# Patient Record
Sex: FEMALE | Race: WHITE | NOT HISPANIC OR LATINO | Employment: OTHER | URBAN - METROPOLITAN AREA
[De-identification: names, ages, dates, MRNs, and addresses within clinical notes are randomized per-mention and may not be internally consistent; named-entity substitution may affect disease eponyms.]

---

## 2018-03-28 NOTE — PATIENT DISCUSSION
Cataracts are visually significant and impacting patients quality of life.   Refer to [facility] for Cataract Removal Consultation

## 2018-03-28 NOTE — PATIENT DISCUSSION
ONH cupping OS, symmetrical pressure, no evidence for cause of unilateral cupping, maybe old stroke or trauma. nerve not very pale. pt aware of issue and was told by old eye doc that nerve was weak.   request records from [facility]

## 2018-04-16 NOTE — PATIENT DISCUSSION
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 I have advised the patient to refrain from driving.

## 2018-05-11 NOTE — PATIENT DISCUSSION
POST-OP INSTRUCTIONS OS:  The patient is instructed in the proper use of post-operative eyedrops: Ocuflox in the affected eye 4 times per day for 2 weeks and prednisolone acetate 1% 4 times a day for 2 weeks then twice a day for 2 weeks. The patient is also inctructed to follow the usual post-operative precautions outlined in the written instructions including the need to keep a shield taped over the operated eye while sleeping for 2 weeks, and the need to avoid heavy lifting for 2 weeks. The patient is instructed to contact me immediately for any significant pain around the eye, or sustained loss of vision. The patient will return for follow-up in one week.

## 2018-05-11 NOTE — PATIENT DISCUSSION
Continue: Pred Forte (prednisolone acetate): drops,suspension: 1% 1 drop four times a day into affected eye 04-

## 2018-09-11 ENCOUNTER — ESTABLISHED COMPREHENSIVE EXAM (OUTPATIENT)
Dept: URBAN - METROPOLITAN AREA CLINIC 39 | Facility: CLINIC | Age: 68
End: 2018-09-11

## 2018-09-11 DIAGNOSIS — H25.811: ICD-10-CM

## 2018-09-11 DIAGNOSIS — H43.813: ICD-10-CM

## 2018-09-11 DIAGNOSIS — H18.453: ICD-10-CM

## 2018-09-11 DIAGNOSIS — H25.812: ICD-10-CM

## 2018-09-11 DIAGNOSIS — H16.223: ICD-10-CM

## 2018-09-11 PROCEDURE — 92015 DETERMINE REFRACTIVE STATE: CPT

## 2018-09-11 PROCEDURE — 92014 COMPRE OPH EXAM EST PT 1/>: CPT

## 2018-09-11 PROCEDURE — G9903 PT SCRN TBCO ID AS NON USER: HCPCS

## 2018-09-11 PROCEDURE — 1036F TOBACCO NON-USER: CPT

## 2018-09-11 PROCEDURE — G8427 DOCREV CUR MEDS BY ELIG CLIN: HCPCS

## 2018-09-11 PROCEDURE — G8785 BP SCRN NO PERF AT INTERVAL: HCPCS

## 2018-09-11 ASSESSMENT — VISUAL ACUITY
OD_SC: 20/60
OD_SC: J6
OS_CC: J1+
OS_SC: J5
OD_BAT: 20/60
OS_BAT: 20/70
OD_CC: J2
OS_SC: 20/40-2
OS_CC: 20/25-2
OD_CC: 20/30

## 2018-09-11 ASSESSMENT — TONOMETRY
OD_IOP_MMHG: 14
OS_IOP_MMHG: 15

## 2019-08-13 ENCOUNTER — CATARACT CONSULT (OUTPATIENT)
Dept: URBAN - METROPOLITAN AREA CLINIC 39 | Facility: CLINIC | Age: 69
End: 2019-08-13

## 2019-08-13 DIAGNOSIS — H25.811: ICD-10-CM

## 2019-08-13 DIAGNOSIS — H04.123: ICD-10-CM

## 2019-08-13 DIAGNOSIS — H18.451: ICD-10-CM

## 2019-08-13 DIAGNOSIS — H25.812: ICD-10-CM

## 2019-08-13 DIAGNOSIS — H18.452: ICD-10-CM

## 2019-08-13 PROCEDURE — 92134 CPTRZ OPH DX IMG PST SGM RTA: CPT

## 2019-08-13 PROCEDURE — 92025 CPTRIZED CORNEAL TOPOGRAPHY: CPT

## 2019-08-13 PROCEDURE — 92136TC INTERFEROMETRY - TECHNICAL COMPONENT

## 2019-08-13 PROCEDURE — V2799CY CYCLOSPORINE 0.1% - KLARITY C

## 2019-08-13 PROCEDURE — 92014 COMPRE OPH EXAM EST PT 1/>: CPT

## 2019-08-13 ASSESSMENT — VISUAL ACUITY
OD_SC: 20/40
OS_CC: 20/25
OD_CC: J3
OS_BAT: 20/200
OS_CC: J3
OS_SC: 20/40+2
OD_CC: 20/30-1
OD_BAT: 20/200

## 2019-08-13 ASSESSMENT — TONOMETRY
OS_IOP_MMHG: 15
OD_IOP_MMHG: 13

## 2019-09-11 ENCOUNTER — PRE-OP/H&P (OUTPATIENT)
Dept: URBAN - METROPOLITAN AREA SURGERY 14 | Facility: SURGERY | Age: 69
End: 2019-09-11

## 2019-09-11 ENCOUNTER — SURGERY/PROCEDURE (OUTPATIENT)
Dept: URBAN - METROPOLITAN AREA CLINIC 39 | Facility: CLINIC | Age: 69
End: 2019-09-11

## 2019-09-11 DIAGNOSIS — H25.811: ICD-10-CM

## 2019-09-11 DIAGNOSIS — H18.451: ICD-10-CM

## 2019-09-11 DIAGNOSIS — H18.452: ICD-10-CM

## 2019-09-11 DIAGNOSIS — H25.812: ICD-10-CM

## 2019-09-11 DIAGNOSIS — H04.123: ICD-10-CM

## 2019-09-11 PROCEDURE — 65435 CURETTE/TREAT CORNEA: CPT

## 2019-09-11 PROCEDURE — 99499 UNLISTED E&M SERVICE: CPT

## 2019-09-11 PROCEDURE — 65400 REMOVAL OF EYE LESION: CPT

## 2019-09-12 ENCOUNTER — 1 DAY POST-OP (OUTPATIENT)
Dept: URBAN - METROPOLITAN AREA CLINIC 39 | Facility: CLINIC | Age: 69
End: 2019-09-12

## 2019-09-12 DIAGNOSIS — Z98.890: ICD-10-CM

## 2019-09-12 PROCEDURE — 66999PO NON CO-MANAGED OTHER SURGERY PO

## 2019-09-12 ASSESSMENT — VISUAL ACUITY: OD_SC: 20/60-2

## 2019-09-18 ENCOUNTER — POST-OP (OUTPATIENT)
Dept: URBAN - METROPOLITAN AREA CLINIC 39 | Facility: CLINIC | Age: 69
End: 2019-09-18

## 2019-09-18 DIAGNOSIS — Z98.890: ICD-10-CM

## 2019-09-18 PROCEDURE — 66999PO NON CO-MANAGED OTHER SURGERY PO

## 2019-10-23 ENCOUNTER — POST-OP (OUTPATIENT)
Dept: URBAN - METROPOLITAN AREA CLINIC 39 | Facility: CLINIC | Age: 69
End: 2019-10-23

## 2019-10-23 DIAGNOSIS — Z98.890: ICD-10-CM

## 2019-10-23 PROCEDURE — 66999PO NON CO-MANAGED OTHER SURGERY PO

## 2019-10-23 ASSESSMENT — VISUAL ACUITY
OD_CC: 20/40+2
OD_SC: J5
OD_SC: 20/40-1
OD_CC: J1

## 2019-10-23 ASSESSMENT — TONOMETRY
OD_IOP_MMHG: 13
OS_IOP_MMHG: 12

## 2020-07-28 ENCOUNTER — CORNEA CONSULT (OUTPATIENT)
Dept: URBAN - METROPOLITAN AREA CLINIC 39 | Facility: CLINIC | Age: 70
End: 2020-07-28

## 2020-07-28 DIAGNOSIS — H04.123: ICD-10-CM

## 2020-07-28 DIAGNOSIS — H43.813: ICD-10-CM

## 2020-07-28 DIAGNOSIS — H18.452: ICD-10-CM

## 2020-07-28 DIAGNOSIS — H25.811: ICD-10-CM

## 2020-07-28 DIAGNOSIS — H25.812: ICD-10-CM

## 2020-07-28 PROCEDURE — 92015 DETERMINE REFRACTIVE STATE: CPT

## 2020-07-28 PROCEDURE — 92134 CPTRZ OPH DX IMG PST SGM RTA: CPT

## 2020-07-28 PROCEDURE — V2799PMN IMPRIMIS PRED-MOXI-NEPAF 5ML

## 2020-07-28 PROCEDURE — 92025 CPTRIZED CORNEAL TOPOGRAPHY: CPT

## 2020-07-28 PROCEDURE — 92012 INTRM OPH EXAM EST PATIENT: CPT

## 2020-07-28 PROCEDURE — 92136TC INTERFEROMETRY - TECHNICAL COMPONENT

## 2020-07-28 PROCEDURE — 92286 ANT SGM IMG I&R SPECLR MIC: CPT

## 2020-07-28 ASSESSMENT — VISUAL ACUITY
OS_CC: 20/40+1
OU_CC: 20/30
OD_CC: 20/40-
OU_CC: J1-
OS_CC: J2
OD_CC: J1-

## 2020-07-28 ASSESSMENT — TONOMETRY
OS_IOP_MMHG: 11
OD_IOP_MMHG: 11

## 2020-08-13 ENCOUNTER — ESTABLISHED PATIENT (OUTPATIENT)
Dept: URBAN - METROPOLITAN AREA SURGERY 14 | Facility: SURGERY | Age: 70
End: 2020-08-13

## 2020-08-13 ENCOUNTER — SURGERY/PROCEDURE (OUTPATIENT)
Dept: URBAN - METROPOLITAN AREA CLINIC 39 | Facility: CLINIC | Age: 70
End: 2020-08-13

## 2020-08-13 DIAGNOSIS — H25.811: ICD-10-CM

## 2020-08-13 DIAGNOSIS — Z98.890: ICD-10-CM

## 2020-08-13 DIAGNOSIS — H25.812: ICD-10-CM

## 2020-08-13 DIAGNOSIS — H18.452: ICD-10-CM

## 2020-08-13 PROCEDURE — 66984AV REMOVE CATARACT, INSERT ADVANCED LENS

## 2020-08-13 PROCEDURE — 66999LNSR LENSAR LASER FOR CAT SX

## 2020-08-13 PROCEDURE — 99211HP H&P OFFICE/OUTPATIENT VISIT, EST

## 2020-08-13 PROCEDURE — 65772LRI LRI DURING CAT SX

## 2020-08-14 ENCOUNTER — CATARACT POST-OP 1-DAY (OUTPATIENT)
Dept: URBAN - METROPOLITAN AREA CLINIC 39 | Facility: CLINIC | Age: 70
End: 2020-08-14

## 2020-08-14 DIAGNOSIS — Z96.1: ICD-10-CM

## 2020-08-14 PROCEDURE — 99024 POSTOP FOLLOW-UP VISIT: CPT

## 2020-08-14 ASSESSMENT — VISUAL ACUITY
OD_SC: J5
OD_SC: 20/30-1

## 2020-08-14 ASSESSMENT — TONOMETRY
OD_IOP_MMHG: 13
OS_IOP_MMHG: 12

## 2020-08-18 ENCOUNTER — POST OP/EVAL OF SECOND EYE (OUTPATIENT)
Dept: URBAN - METROPOLITAN AREA CLINIC 39 | Facility: CLINIC | Age: 70
End: 2020-08-18

## 2020-08-18 DIAGNOSIS — H25.812: ICD-10-CM

## 2020-08-18 PROCEDURE — 92012 INTRM OPH EXAM EST PATIENT: CPT

## 2020-08-18 ASSESSMENT — TONOMETRY
OD_IOP_MMHG: 12
OS_IOP_MMHG: 13

## 2020-08-18 ASSESSMENT — VISUAL ACUITY
OS_BAT: 20/200
OD_SC: J6
OD_PH: 20/40
OD_SC: 20/60

## 2020-08-18 ASSESSMENT — PACHYMETRY
OS_CT_UM: 534
OD_CT_UM: 549

## 2020-08-20 ENCOUNTER — FOLLOW UP (OUTPATIENT)
Dept: URBAN - METROPOLITAN AREA CLINIC 39 | Facility: CLINIC | Age: 70
End: 2020-08-20

## 2020-08-20 ENCOUNTER — SURGERY/PROCEDURE (OUTPATIENT)
Dept: URBAN - METROPOLITAN AREA CLINIC 39 | Facility: CLINIC | Age: 70
End: 2020-08-20

## 2020-08-20 ENCOUNTER — ESTABLISHED PATIENT (OUTPATIENT)
Dept: URBAN - METROPOLITAN AREA SURGERY 14 | Facility: SURGERY | Age: 70
End: 2020-08-20

## 2020-08-20 DIAGNOSIS — Z96.1: ICD-10-CM

## 2020-08-20 DIAGNOSIS — H43.813: ICD-10-CM

## 2020-08-20 DIAGNOSIS — H04.123: ICD-10-CM

## 2020-08-20 DIAGNOSIS — H25.812: ICD-10-CM

## 2020-08-20 DIAGNOSIS — H18.452: ICD-10-CM

## 2020-08-20 DIAGNOSIS — H26.491: ICD-10-CM

## 2020-08-20 PROCEDURE — 99211T TECH SERVICE

## 2020-08-20 PROCEDURE — 66999LNSR LENSAR LASER FOR CAT SX

## 2020-08-20 PROCEDURE — 65772LRI LRI DURING CAT SX

## 2020-08-20 PROCEDURE — 99211HP H&P OFFICE/OUTPATIENT VISIT, EST

## 2020-08-20 PROCEDURE — 66984AV REMOVE CATARACT, INSERT ADVANCED LENS

## 2020-08-20 ASSESSMENT — VISUAL ACUITY
OS_SC: 20/40
OS_SC: J8
OD_SC: 20/40

## 2020-08-21 ENCOUNTER — CATARACT POST-OP 1-DAY (OUTPATIENT)
Dept: URBAN - METROPOLITAN AREA CLINIC 39 | Facility: CLINIC | Age: 70
End: 2020-08-21

## 2020-08-21 DIAGNOSIS — Z96.1: ICD-10-CM

## 2020-08-21 PROCEDURE — 99024 POSTOP FOLLOW-UP VISIT: CPT

## 2020-08-21 ASSESSMENT — VISUAL ACUITY
OS_SC: J12
OS_SC: 20/50-1
OD_SC: J4
OD_SC: 20/40-2

## 2020-08-21 ASSESSMENT — TONOMETRY
OS_IOP_MMHG: 19
OD_IOP_MMHG: 12

## 2020-09-11 ENCOUNTER — POST-OP CATARACT (OUTPATIENT)
Dept: URBAN - METROPOLITAN AREA CLINIC 39 | Facility: CLINIC | Age: 70
End: 2020-09-11

## 2020-09-11 DIAGNOSIS — Z96.1: ICD-10-CM

## 2020-09-11 PROCEDURE — 99024 POSTOP FOLLOW-UP VISIT: CPT

## 2020-09-11 ASSESSMENT — VISUAL ACUITY
OS_SC: 20/25+2
OS_SC: J3
OD_SC: J6
OD_SC: 20/70

## 2020-09-11 ASSESSMENT — TONOMETRY
OD_IOP_MMHG: 10
OS_IOP_MMHG: 10

## 2020-09-29 ENCOUNTER — POST-OP CATARACT (OUTPATIENT)
Dept: URBAN - METROPOLITAN AREA CLINIC 39 | Facility: CLINIC | Age: 70
End: 2020-09-29

## 2020-09-29 DIAGNOSIS — Z96.1: ICD-10-CM

## 2020-09-29 PROCEDURE — 99024 POSTOP FOLLOW-UP VISIT: CPT

## 2020-09-29 ASSESSMENT — VISUAL ACUITY
OS_BAT: 20/50
OS_SC: 20/25-1
OD_BAT: 20/200
OS_SC: J1
OU_SC: 20/25
OD_SC: 20/40-1
OD_SC: J5

## 2020-09-29 ASSESSMENT — TONOMETRY
OD_IOP_MMHG: 10
OS_IOP_MMHG: 10

## 2020-10-21 ENCOUNTER — SURGERY/PROCEDURE (OUTPATIENT)
Dept: URBAN - METROPOLITAN AREA SURGERY 14 | Facility: SURGERY | Age: 70
End: 2020-10-21

## 2020-10-21 ENCOUNTER — ESTABLISHED PATIENT (OUTPATIENT)
Dept: URBAN - METROPOLITAN AREA CLINIC 39 | Facility: CLINIC | Age: 70
End: 2020-10-21

## 2020-10-21 DIAGNOSIS — H26.493: ICD-10-CM

## 2020-10-21 PROCEDURE — 92014 COMPRE OPH EXAM EST PT 1/>: CPT

## 2020-10-21 PROCEDURE — 6682150 YAG CAPSULOTOMY

## 2020-10-21 ASSESSMENT — TONOMETRY
OS_IOP_MMHG: 9
OD_IOP_MMHG: 12

## 2020-10-21 ASSESSMENT — VISUAL ACUITY
OD_BAT: 20/200
OS_SC: 20/30
OD_SC: 20/40+1
OS_BAT: 20/50

## 2020-10-28 ENCOUNTER — YAG POST-OP (OUTPATIENT)
Dept: URBAN - METROPOLITAN AREA CLINIC 39 | Facility: CLINIC | Age: 70
End: 2020-10-28

## 2020-10-28 DIAGNOSIS — Z98.890: ICD-10-CM

## 2020-10-28 PROCEDURE — 99024 POSTOP FOLLOW-UP VISIT: CPT

## 2020-10-28 ASSESSMENT — KERATOMETRY
OS_K1POWER_DIOPTERS: 44.25
OD_K2POWER_DIOPTERS: 45.5
OD_K1POWER_DIOPTERS: 44.5
OS_K2POWER_DIOPTERS: 46
OD_AXISANGLE2_DEGREES: 135
OS_AXISANGLE2_DEGREES: 113
OD_AXISANGLE_DEGREES: 45
OS_AXISANGLE_DEGREES: 23

## 2020-10-28 ASSESSMENT — VISUAL ACUITY
OU_SC: J2
OS_SC: 20/25+2
OU_SC: 20/20-2
OD_SC: J5
OS_SC: J2
OD_SC: 20/30-1

## 2020-10-28 ASSESSMENT — TONOMETRY
OD_IOP_MMHG: 9
OS_IOP_MMHG: 9

## 2021-04-28 ENCOUNTER — ESTABLISHED COMPREHENSIVE EXAM (OUTPATIENT)
Dept: URBAN - METROPOLITAN AREA CLINIC 39 | Facility: CLINIC | Age: 71
End: 2021-04-28

## 2021-04-28 DIAGNOSIS — H52.223: ICD-10-CM

## 2021-04-28 DIAGNOSIS — H43.813: ICD-10-CM

## 2021-04-28 DIAGNOSIS — H52.4: ICD-10-CM

## 2021-04-28 DIAGNOSIS — H16.223: ICD-10-CM

## 2021-04-28 DIAGNOSIS — H52.01: ICD-10-CM

## 2021-04-28 DIAGNOSIS — H18.452: ICD-10-CM

## 2021-04-28 PROCEDURE — 68761S PUNCTUM PLUG / SILICONE,EACH

## 2021-04-28 PROCEDURE — 92015 DETERMINE REFRACTIVE STATE: CPT

## 2021-04-28 PROCEDURE — A4263 PERMANENT TEAR DUCT PLUG: HCPCS

## 2021-04-28 PROCEDURE — 92014 COMPRE OPH EXAM EST PT 1/>: CPT

## 2021-04-28 ASSESSMENT — KERATOMETRY
OS_AXISANGLE2_DEGREES: 113
OD_K1POWER_DIOPTERS: 44.5
OD_K2POWER_DIOPTERS: 45.5
OS_K2POWER_DIOPTERS: 46
OS_AXISANGLE_DEGREES: 23
OD_AXISANGLE2_DEGREES: 135
OD_AXISANGLE_DEGREES: 45
OS_K1POWER_DIOPTERS: 44.25

## 2021-04-28 ASSESSMENT — VISUAL ACUITY
OU_SC: J5
OD_SC: J10
OD_CC: J3
OU_CC: J2
OD_SC: 20/30
OU_SC: 20/25-2
OS_CC: J3
OS_SC: J6
OS_SC: 20/30

## 2021-04-28 ASSESSMENT — TONOMETRY
OS_IOP_MMHG: 12
OD_IOP_MMHG: 12

## 2021-06-01 ENCOUNTER — FOLLOW UP (OUTPATIENT)
Dept: URBAN - METROPOLITAN AREA CLINIC 39 | Facility: CLINIC | Age: 71
End: 2021-06-01

## 2021-06-01 DIAGNOSIS — H18.452: ICD-10-CM

## 2021-06-01 DIAGNOSIS — H16.223: ICD-10-CM

## 2021-06-01 DIAGNOSIS — H43.813: ICD-10-CM

## 2021-06-01 PROCEDURE — 92012 INTRM OPH EXAM EST PATIENT: CPT

## 2021-06-01 ASSESSMENT — VISUAL ACUITY
OD_SC: J8
OD_SC: 20/30-2
OS_SC: J3
OS_SC: 20/30-1
OU_SC: 20/25
OU_SC: J2

## 2021-06-01 ASSESSMENT — KERATOMETRY
OD_K1POWER_DIOPTERS: 44.5
OS_AXISANGLE_DEGREES: 23
OS_K1POWER_DIOPTERS: 44.25
OS_K2POWER_DIOPTERS: 46
OD_K2POWER_DIOPTERS: 45.5
OD_AXISANGLE_DEGREES: 45
OD_AXISANGLE2_DEGREES: 135
OS_AXISANGLE2_DEGREES: 113

## 2021-06-01 ASSESSMENT — TONOMETRY
OS_IOP_MMHG: 10
OD_IOP_MMHG: 10

## 2021-06-16 ENCOUNTER — CONTACT LENS FOLLOW UP (OUTPATIENT)
Dept: URBAN - METROPOLITAN AREA CLINIC 39 | Facility: CLINIC | Age: 71
End: 2021-06-16

## 2021-06-16 DIAGNOSIS — H52.223: ICD-10-CM

## 2021-06-16 DIAGNOSIS — H52.4: ICD-10-CM

## 2021-06-16 PROCEDURE — 92310AV CL MGMNT ADVANCED VISION TRIAL ALL INCL

## 2021-06-16 ASSESSMENT — VISUAL ACUITY
OD_CC: 20/20
OD_CC: J1

## 2021-06-16 ASSESSMENT — KERATOMETRY
OS_K2POWER_DIOPTERS: 46
OD_K1POWER_DIOPTERS: 44.5
OS_AXISANGLE_DEGREES: 23
OD_K2POWER_DIOPTERS: 45.5
OD_AXISANGLE2_DEGREES: 135
OS_K1POWER_DIOPTERS: 44.25
OS_AXISANGLE2_DEGREES: 113
OD_AXISANGLE_DEGREES: 45

## 2021-06-18 ENCOUNTER — CONTACT LENS FOLLOW UP (OUTPATIENT)
Dept: URBAN - METROPOLITAN AREA CLINIC 39 | Facility: CLINIC | Age: 71
End: 2021-06-18

## 2021-06-18 DIAGNOSIS — H52.4: ICD-10-CM

## 2021-06-18 DIAGNOSIS — H52.223: ICD-10-CM

## 2021-06-18 PROCEDURE — 99024 POSTOP FOLLOW-UP VISIT: CPT

## 2021-06-18 ASSESSMENT — KERATOMETRY
OD_K1POWER_DIOPTERS: 44.5
OS_AXISANGLE_DEGREES: 23
OS_AXISANGLE2_DEGREES: 113
OD_AXISANGLE2_DEGREES: 135
OD_AXISANGLE_DEGREES: 45
OS_K2POWER_DIOPTERS: 46
OS_K1POWER_DIOPTERS: 44.25
OD_K2POWER_DIOPTERS: 45.5

## 2021-06-18 ASSESSMENT — VISUAL ACUITY
OU_CC: J2
OU_CC: 20/20
OS_CC: 20/20 CL
OD_CC: 20/30+2 CL
OD_CC: J8 CL
OS_CC: J3 CL

## 2021-08-05 ENCOUNTER — SURGERY/PROCEDURE (OUTPATIENT)
Dept: URBAN - METROPOLITAN AREA CLINIC 39 | Facility: CLINIC | Age: 71
End: 2021-08-05

## 2021-08-05 DIAGNOSIS — H52.201: ICD-10-CM

## 2021-08-05 DIAGNOSIS — H52.202: ICD-10-CM

## 2021-08-05 PROCEDURE — 66999SPP EPI LASEK S/P ADVANCED / CUSTOM < 12 MONTHS

## 2021-08-06 ENCOUNTER — 1 DAY LASIK POST OP (OUTPATIENT)
Dept: URBAN - METROPOLITAN AREA CLINIC 39 | Facility: CLINIC | Age: 71
End: 2021-08-06

## 2021-08-06 DIAGNOSIS — Z98.890: ICD-10-CM

## 2021-08-06 PROCEDURE — 99024 POSTOP FOLLOW-UP VISIT: CPT

## 2021-08-06 ASSESSMENT — VISUAL ACUITY
OS_PH: 20/70
OD_PH: 20/60-1
OS_SC: 20/80+1
OD_SC: 20/100
OD_SC: J6
OS_SC: J8

## 2021-08-06 ASSESSMENT — KERATOMETRY
OD_AXISANGLE2_DEGREES: 135
OS_K2POWER_DIOPTERS: 46
OS_K1POWER_DIOPTERS: 44.25
OD_K2POWER_DIOPTERS: 45.5
OD_AXISANGLE_DEGREES: 45
OS_AXISANGLE_DEGREES: 23
OD_K1POWER_DIOPTERS: 44.5
OS_AXISANGLE2_DEGREES: 113

## 2021-08-10 ASSESSMENT — KERATOMETRY
OD_AXISANGLE_DEGREES: 45
OD_K1POWER_DIOPTERS: 44.5
OS_K1POWER_DIOPTERS: 44.25
OS_AXISANGLE2_DEGREES: 113
OD_AXISANGLE2_DEGREES: 135
OS_AXISANGLE_DEGREES: 23
OD_K2POWER_DIOPTERS: 45.5
OS_K2POWER_DIOPTERS: 46

## 2021-08-11 ASSESSMENT — KERATOMETRY
OS_K2POWER_DIOPTERS: 46
OS_K1POWER_DIOPTERS: 44.25
OD_K1POWER_DIOPTERS: 44.5
OD_K2POWER_DIOPTERS: 45.5
OD_AXISANGLE2_DEGREES: 135
OD_AXISANGLE_DEGREES: 45
OS_AXISANGLE2_DEGREES: 113
OS_AXISANGLE_DEGREES: 23

## 2021-08-12 ENCOUNTER — LASIK POST OP (OUTPATIENT)
Dept: URBAN - METROPOLITAN AREA CLINIC 39 | Facility: CLINIC | Age: 71
End: 2021-08-12

## 2021-08-12 DIAGNOSIS — Z98.890: ICD-10-CM

## 2021-08-12 PROCEDURE — 99024 POSTOP FOLLOW-UP VISIT: CPT

## 2021-08-12 ASSESSMENT — VISUAL ACUITY
OS_SC: 20/40
OS_PH: 20/30-1
OS_SC: J6
OD_SC: J6
OD_PH: 20/40-1
OD_SC: 20/50+2

## 2021-09-23 ENCOUNTER — LASIK POST OP (OUTPATIENT)
Dept: URBAN - METROPOLITAN AREA CLINIC 39 | Facility: CLINIC | Age: 71
End: 2021-09-23

## 2021-09-23 DIAGNOSIS — Z98.890: ICD-10-CM

## 2021-09-23 PROCEDURE — 99024 POSTOP FOLLOW-UP VISIT: CPT

## 2021-09-23 ASSESSMENT — TONOMETRY
OD_IOP_MMHG: 12
OS_IOP_MMHG: 12

## 2021-09-23 ASSESSMENT — KERATOMETRY
OS_AXISANGLE_DEGREES: 23
OS_K1POWER_DIOPTERS: 44.25
OD_K2POWER_DIOPTERS: 45.5
OS_K2POWER_DIOPTERS: 46
OS_AXISANGLE2_DEGREES: 113
OD_K1POWER_DIOPTERS: 44.5
OD_AXISANGLE_DEGREES: 45
OD_AXISANGLE2_DEGREES: 135

## 2021-09-23 ASSESSMENT — VISUAL ACUITY
OD_SC: 20/25-1
OS_SC: J2
OS_SC: 20/20-2
OD_SC: J3

## 2022-07-21 ENCOUNTER — COMPREHENSIVE EXAM (OUTPATIENT)
Dept: URBAN - METROPOLITAN AREA CLINIC 39 | Facility: CLINIC | Age: 72
End: 2022-07-21

## 2022-07-21 DIAGNOSIS — H43.813: ICD-10-CM

## 2022-07-21 DIAGNOSIS — H18.452: ICD-10-CM

## 2022-07-21 DIAGNOSIS — H52.4: ICD-10-CM

## 2022-07-21 DIAGNOSIS — H16.223: ICD-10-CM

## 2022-07-21 DIAGNOSIS — H52.223: ICD-10-CM

## 2022-07-21 DIAGNOSIS — H52.01: ICD-10-CM

## 2022-07-21 PROCEDURE — 92014 COMPRE OPH EXAM EST PT 1/>: CPT

## 2022-07-21 PROCEDURE — 92015 DETERMINE REFRACTIVE STATE: CPT

## 2022-07-21 ASSESSMENT — VISUAL ACUITY
OS_CC: J2
OS_SC: 20/25-2
OD_SC: J8
OD_SC: 20/25
OS_SC: J3
OD_CC: J1

## 2022-07-21 ASSESSMENT — KERATOMETRY
OS_K1POWER_DIOPTERS: 44.25
OD_K2POWER_DIOPTERS: 45.5
OS_AXISANGLE2_DEGREES: 113
OS_K2POWER_DIOPTERS: 46
OD_AXISANGLE2_DEGREES: 135
OD_K1POWER_DIOPTERS: 44.5
OS_AXISANGLE_DEGREES: 23
OD_AXISANGLE_DEGREES: 45

## 2022-07-21 ASSESSMENT — TONOMETRY
OS_IOP_MMHG: 12
OD_IOP_MMHG: 12

## 2023-04-14 ENCOUNTER — EMERGENCY VISIT (OUTPATIENT)
Dept: URBAN - METROPOLITAN AREA CLINIC 39 | Facility: CLINIC | Age: 73
End: 2023-04-14

## 2023-04-14 DIAGNOSIS — H16.223: ICD-10-CM

## 2023-04-14 DIAGNOSIS — H18.452: ICD-10-CM

## 2023-04-14 DIAGNOSIS — H43.813: ICD-10-CM

## 2023-04-14 PROCEDURE — 92014 COMPRE OPH EXAM EST PT 1/>: CPT

## 2023-04-14 ASSESSMENT — VISUAL ACUITY
OU_CC: J3
OS_SC: 20/20
OD_CC: J3
OD_SC: 20/25-1
OS_CC: J4
OU_SC: 20/20

## 2023-04-14 ASSESSMENT — KERATOMETRY
OS_K1POWER_DIOPTERS: 44.25
OS_K2POWER_DIOPTERS: 46
OD_AXISANGLE_DEGREES: 45
OS_AXISANGLE2_DEGREES: 113
OD_K1POWER_DIOPTERS: 44.5
OD_AXISANGLE2_DEGREES: 135
OD_K2POWER_DIOPTERS: 45.5
OS_AXISANGLE_DEGREES: 23

## 2023-04-14 ASSESSMENT — TONOMETRY
OD_IOP_MMHG: 11
OS_IOP_MMHG: 11

## 2023-07-21 ENCOUNTER — COMPREHENSIVE EXAM (OUTPATIENT)
Dept: URBAN - METROPOLITAN AREA CLINIC 39 | Facility: CLINIC | Age: 73
End: 2023-07-21

## 2023-07-21 DIAGNOSIS — H52.01: ICD-10-CM

## 2023-07-21 DIAGNOSIS — H52.223: ICD-10-CM

## 2023-07-21 DIAGNOSIS — H16.223: ICD-10-CM

## 2023-07-21 DIAGNOSIS — H18.452: ICD-10-CM

## 2023-07-21 DIAGNOSIS — H52.4: ICD-10-CM

## 2023-07-21 DIAGNOSIS — H43.813: ICD-10-CM

## 2023-07-21 PROCEDURE — 92014 COMPRE OPH EXAM EST PT 1/>: CPT

## 2023-07-21 PROCEDURE — 92015 DETERMINE REFRACTIVE STATE: CPT

## 2023-07-21 ASSESSMENT — TONOMETRY
OS_IOP_MMHG: 10
OD_IOP_MMHG: 10

## 2023-07-21 ASSESSMENT — VISUAL ACUITY
OU_CC: J2
OS_SC: 20/20-1
OS_SC: J3
OD_SC: 20/25
OD_CC: J2
OU_SC: 20/20-1
OD_SC: J8
OU_SC: J3
OS_CC: J3

## 2023-07-21 ASSESSMENT — KERATOMETRY
OS_AXISANGLE2_DEGREES: 113
OD_AXISANGLE2_DEGREES: 135
OS_K1POWER_DIOPTERS: 44.25
OD_AXISANGLE_DEGREES: 45
OS_K2POWER_DIOPTERS: 46
OD_K2POWER_DIOPTERS: 45.5
OD_K1POWER_DIOPTERS: 44.5
OS_AXISANGLE_DEGREES: 23

## 2024-07-24 ENCOUNTER — COMPREHENSIVE EXAM (OUTPATIENT)
Dept: URBAN - METROPOLITAN AREA CLINIC 39 | Facility: CLINIC | Age: 74
End: 2024-07-24

## 2024-07-24 ASSESSMENT — KERATOMETRY
OD_K2POWER_DIOPTERS: 45.5
OS_AXISANGLE2_DEGREES: 113
OS_K2POWER_DIOPTERS: 46
OD_AXISANGLE_DEGREES: 45
OD_AXISANGLE2_DEGREES: 135
OS_AXISANGLE_DEGREES: 23
OD_K1POWER_DIOPTERS: 44.5
OS_K1POWER_DIOPTERS: 44.25

## 2024-11-26 ENCOUNTER — COMPREHENSIVE EXAM (OUTPATIENT)
Dept: URBAN - METROPOLITAN AREA CLINIC 39 | Facility: CLINIC | Age: 74
End: 2024-11-26

## 2024-11-26 DIAGNOSIS — H52.4: ICD-10-CM

## 2024-11-26 DIAGNOSIS — H52.01: ICD-10-CM

## 2024-11-26 DIAGNOSIS — H52.223: ICD-10-CM

## 2024-11-26 DIAGNOSIS — H43.813: ICD-10-CM

## 2024-11-26 DIAGNOSIS — H18.452: ICD-10-CM

## 2024-11-26 DIAGNOSIS — H16.223: ICD-10-CM

## 2024-11-26 PROCEDURE — 92014 COMPRE OPH EXAM EST PT 1/>: CPT

## 2024-11-26 PROCEDURE — 92015 DETERMINE REFRACTIVE STATE: CPT

## 2025-02-26 ENCOUNTER — FOLLOW UP (OUTPATIENT)
Age: 75
End: 2025-02-26

## 2025-02-26 DIAGNOSIS — H16.223: ICD-10-CM

## 2025-02-26 PROCEDURE — 92012 INTRM OPH EXAM EST PATIENT: CPT
